# Patient Record
Sex: MALE | Race: OTHER | Employment: UNEMPLOYED | URBAN - METROPOLITAN AREA
[De-identification: names, ages, dates, MRNs, and addresses within clinical notes are randomized per-mention and may not be internally consistent; named-entity substitution may affect disease eponyms.]

---

## 2021-11-24 ENCOUNTER — HOSPITAL ENCOUNTER (EMERGENCY)
Age: 21
Discharge: HOME | End: 2021-11-24

## 2021-11-24 VITALS
RESPIRATION RATE: 16 BRPM | BODY MASS INDEX: 20.56 KG/M2 | WEIGHT: 104.72 LBS | TEMPERATURE: 98.8 F | HEIGHT: 60 IN | DIASTOLIC BLOOD PRESSURE: 96 MMHG | SYSTOLIC BLOOD PRESSURE: 141 MMHG | HEART RATE: 72 BPM | OXYGEN SATURATION: 98 %

## 2021-11-24 DIAGNOSIS — L73.9 FOLLICULITIS: Primary | ICD-10-CM

## 2021-11-24 PROCEDURE — 6360000002 HC RX W HCPCS: Performed by: GENERAL ACUTE CARE HOSPITAL

## 2021-11-24 PROCEDURE — 6370000000 HC RX 637 (ALT 250 FOR IP): Performed by: GENERAL ACUTE CARE HOSPITAL

## 2021-11-24 PROCEDURE — 99283 EMERGENCY DEPT VISIT LOW MDM: CPT

## 2021-11-24 PROCEDURE — 96372 THER/PROPH/DIAG INJ SC/IM: CPT

## 2021-11-24 RX ORDER — CEPHALEXIN 500 MG/1
500 CAPSULE ORAL ONCE
Status: COMPLETED | OUTPATIENT
Start: 2021-11-24 | End: 2021-11-24

## 2021-11-24 RX ORDER — CEPHALEXIN 500 MG/1
500 CAPSULE ORAL 4 TIMES DAILY
Qty: 40 CAPSULE | Refills: 0 | Status: SHIPPED | OUTPATIENT
Start: 2021-11-24 | End: 2021-12-04

## 2021-11-24 RX ORDER — DEXAMETHASONE SODIUM PHOSPHATE 10 MG/ML
8 INJECTION INTRAMUSCULAR; INTRAVENOUS ONCE
Status: COMPLETED | OUTPATIENT
Start: 2021-11-24 | End: 2021-11-24

## 2021-11-24 RX ORDER — PREDNISONE 20 MG/1
40 TABLET ORAL 2 TIMES DAILY
Qty: 20 TABLET | Refills: 0 | Status: SHIPPED | OUTPATIENT
Start: 2021-11-24 | End: 2021-11-29

## 2021-11-24 RX ADMIN — DEXAMETHASONE SODIUM PHOSPHATE 8 MG: 10 INJECTION INTRAMUSCULAR; INTRAVENOUS at 11:57

## 2021-11-24 RX ADMIN — CEPHALEXIN 500 MG: 500 CAPSULE ORAL at 11:57

## 2021-11-24 NOTE — ED PROVIDER NOTES
state.   Neurological: Negative for dizziness, weakness and light-headedness. Hematological: Does not bruise/bleed easily. Psychiatric/Behavioral: Negative for suicidal ideas. Positives and Pertinent negatives as per HPI. Except as noted above in the ROS, all other systems were reviewed and negative. PAST MEDICAL HISTORY   No past medical history on file. SURGICAL HISTORY   No past surgical history on file. [address]       Discharge Medication List as of 11/24/2021 12:09 PM            ALLERGIES     Patient has no known allergies. FAMILYHISTORY     No family history on file. SOCIAL HISTORY       Social History     Tobacco Use    Smoking status: Not on file    Smokeless tobacco: Not on file   Substance Use Topics    Alcohol use: Not on file    Drug use: Not on file       SCREENINGS             PHYSICAL EXAM    (up to 7 for level 4, 8 or more for level 5)     ED Triage Vitals   BP Temp Temp Source Pulse Resp SpO2 Height Weight   11/24/21 1130 11/24/21 1130 11/24/21 1130 11/24/21 1130 11/24/21 1130 11/24/21 1130 11/24/21 1134 11/24/21 1130   (!) 141/96 98.8 °F (37.1 °C) Temporal 72 16 98 % 4' 7.12\" (1.4 m) 104 lb 11.5 oz (47.5 kg)       Physical Exam  Vitals and nursing note reviewed. Constitutional:       General: He is not in acute distress. Appearance: Normal appearance. He is not ill-appearing, toxic-appearing or diaphoretic. HENT:      Head: Normocephalic and atraumatic. Right Ear: External ear normal.      Left Ear: External ear normal.      Nose: Nose normal.      Mouth/Throat:      Pharynx: Oropharynx is clear. Eyes:      General:         Right eye: No discharge. Left eye: No discharge. Extraocular Movements: Extraocular movements intact. Conjunctiva/sclera: Conjunctivae normal.   Cardiovascular:      Rate and Rhythm: Normal rate and regular rhythm. Pulses: Normal pulses. Heart sounds: Normal heart sounds.    Pulmonary: Effort: Pulmonary effort is normal. No respiratory distress. Breath sounds: Normal breath sounds. Abdominal:      General: Bowel sounds are normal.      Palpations: Abdomen is soft. Musculoskeletal:         General: Normal range of motion. Cervical back: Normal range of motion and neck supple. Skin:     General: Skin is warm and dry. Capillary Refill: Capillary refill takes less than 2 seconds. Findings: Rash present. Rash is papular and pustular. Comments: Papulopustular lesions noted to the face, torso, and extremities. There is no fluctuance. There is no drainage. There is no lymphangitic streaking. Neurological:      General: No focal deficit present. Mental Status: He is alert and oriented to person, place, and time. Psychiatric:         Mood and Affect: Mood normal.         Behavior: Behavior normal.         Thought Content: Thought content normal.         Judgment: Judgment normal.         DIAGNOSTIC RESULTS   LABS:    Labs Reviewed - No data to display    When ordered only abnormal lab results are displayed. All other labs were within normal range or not returned as of this dictation. EKG: When ordered, EKG's are interpreted by the Emergency Department Physician in the absence of a cardiologist.  Please see their note for interpretation of EKG. RADIOLOGY:   Non-plain film images such as CT, Ultrasound and MRI are read by the radiologist. Plain radiographic images are visualized and preliminarily interpreted by the ED Provider with the below findings:        Interpretation per the Radiologist below, if available at the time of this note:    No orders to display     No results found.         PROCEDURES   Unless otherwise noted below, none     Procedures    CRITICAL CARE TIME   N/A    CONSULTS:  None      EMERGENCY DEPARTMENT COURSE and DIFFERENTIAL DIAGNOSIS/MDM:   Vitals:    Vitals:    11/24/21 1130 11/24/21 1134   BP: (!) 141/96    Pulse: 72    Resp: 16 Temp: 98.8 °F (37.1 °C)    TempSrc: Temporal    SpO2: 98%    Weight: 104 lb 11.5 oz (47.5 kg)    Height:  4' 7.12\" (1.4 m)       Patient was given the following medications:  Medications   dexamethasone (DECADRON) injection 8 mg (8 mg IntraMUSCular Given 11/24/21 1157)   cephALEXin (KEFLEX) capsule 500 mg (500 mg Oral Given 11/24/21 1157)         Previous records reviewed in order to gain further information regarding patient's PMH as well as his HPI. This is a 24-year-old otherwise healthy male who presents to the emergency department today reporting a pruritic rash which began approximately 2 weeks ago. He denies recent travel or known sick contacts. He has never had anything like this in the past.  Physical exam complete. Patient is nontoxic and afebrile. No signs or symptoms of acute distress noted. Patient noted to have diffuse papular pustular lesions noted. Patient symptoms most consistent with folliculitis. No evidence or clinical features suggestive of a  more emergent diagnosis such as SJS, HSP, TEN, meningococcemia, endocarditis, syphillis, cellulitis, scabies, herpes simplex or erythema multiform. Patient medicated with prednisone and Keflex here in the ED. He is sent with these prescriptions to take as directed at home. Patient is encouraged to wash his body twice daily with antibacterial soap and water. A PCP referral was provided. He agrees to follow-up as directed. He agrees return for high fever, incessant vomiting, severe pain, worsening rash, any other worsening symptoms. He is discharged home with family in stable condition. FINAL IMPRESSION      1.  Folliculitis          DISPOSITION/PLAN   DISPOSITION Decision To Discharge 11/24/2021 11:49:42 AM      PATIENT REFERRED TO:  [facility]) Physicians Pre-Service  [phone]  In 2 days  to establish primary care      DISCHARGE MEDICATIONS:  Discharge Medication List as of 11/24/2021 12:09 PM      START taking these medications    Details   predniSONE (DELTASONE) 20 MG tablet Take 2 tablets by mouth 2 times daily for 5 days, Disp-20 tablet, R-0Print      cephALEXin (KEFLEX) 500 MG capsule Take 1 capsule by mouth 4 times daily for 10 days, Disp-40 capsule, R-0Print             DISCONTINUED MEDICATIONS:  Discharge Medication List as of 11/24/2021 12:09 PM                 (Please note that portions of this note were completed with a voice recognition program.  Efforts were made to edit the dictations but occasionally words are mis-transcribed.)    TERESA Santillan CNP (electronically signed)            TERESA Santillan CNP  11/28/21 1021

## 2021-11-24 NOTE — ED TRIAGE NOTES
Patient unable to answer if he has any allergies, asked several times and he kept saying he did not know if this was allergic reaction.        [place] Estrada [id number] used for triage

## 2021-11-28 ASSESSMENT — ENCOUNTER SYMPTOMS
CHEST TIGHTNESS: 0
ABDOMINAL PAIN: 0
SHORTNESS OF BREATH: 0
WHEEZING: 0